# Patient Record
Sex: MALE | Race: WHITE | URBAN - METROPOLITAN AREA
[De-identification: names, ages, dates, MRNs, and addresses within clinical notes are randomized per-mention and may not be internally consistent; named-entity substitution may affect disease eponyms.]

---

## 2024-02-21 ENCOUNTER — OFFICE VISIT (OUTPATIENT)
Dept: FAMILY MEDICINE CLINIC | Age: 24
End: 2024-02-21
Payer: COMMERCIAL

## 2024-02-21 VITALS
OXYGEN SATURATION: 98 % | WEIGHT: 160 LBS | DIASTOLIC BLOOD PRESSURE: 64 MMHG | BODY MASS INDEX: 22.9 KG/M2 | SYSTOLIC BLOOD PRESSURE: 102 MMHG | HEIGHT: 70 IN | HEART RATE: 83 BPM

## 2024-02-21 DIAGNOSIS — R53.83 OTHER FATIGUE: Primary | ICD-10-CM

## 2024-02-21 PROCEDURE — G8427 DOCREV CUR MEDS BY ELIG CLIN: HCPCS | Performed by: FAMILY MEDICINE

## 2024-02-21 PROCEDURE — 1036F TOBACCO NON-USER: CPT | Performed by: FAMILY MEDICINE

## 2024-02-21 PROCEDURE — G8420 CALC BMI NORM PARAMETERS: HCPCS | Performed by: FAMILY MEDICINE

## 2024-02-21 PROCEDURE — G8484 FLU IMMUNIZE NO ADMIN: HCPCS | Performed by: FAMILY MEDICINE

## 2024-02-21 PROCEDURE — 99204 OFFICE O/P NEW MOD 45 MIN: CPT | Performed by: FAMILY MEDICINE

## 2024-02-21 RX ORDER — M-VIT,TX,IRON,MINS/CALC/FOLIC 27MG-0.4MG
1 TABLET ORAL DAILY
COMMUNITY

## 2024-02-21 ASSESSMENT — PATIENT HEALTH QUESTIONNAIRE - PHQ9
SUM OF ALL RESPONSES TO PHQ QUESTIONS 1-9: 16
SUM OF ALL RESPONSES TO PHQ9 QUESTIONS 1 & 2: 3
9. THOUGHTS THAT YOU WOULD BE BETTER OFF DEAD, OR OF HURTING YOURSELF: 0
6. FEELING BAD ABOUT YOURSELF - OR THAT YOU ARE A FAILURE OR HAVE LET YOURSELF OR YOUR FAMILY DOWN: 2
4. FEELING TIRED OR HAVING LITTLE ENERGY: 3
SUM OF ALL RESPONSES TO PHQ QUESTIONS 1-9: 16
5. POOR APPETITE OR OVEREATING: 2
SUM OF ALL RESPONSES TO PHQ QUESTIONS 1-9: 16
1. LITTLE INTEREST OR PLEASURE IN DOING THINGS: 2
3. TROUBLE FALLING OR STAYING ASLEEP: 0
8. MOVING OR SPEAKING SO SLOWLY THAT OTHER PEOPLE COULD HAVE NOTICED. OR THE OPPOSITE, BEING SO FIGETY OR RESTLESS THAT YOU HAVE BEEN MOVING AROUND A LOT MORE THAN USUAL: 3
SUM OF ALL RESPONSES TO PHQ QUESTIONS 1-9: 16
2. FEELING DOWN, DEPRESSED OR HOPELESS: 1
7. TROUBLE CONCENTRATING ON THINGS, SUCH AS READING THE NEWSPAPER OR WATCHING TELEVISION: 3

## 2024-03-04 ENCOUNTER — TELEMEDICINE (OUTPATIENT)
Dept: PSYCHOLOGY | Age: 24
End: 2024-03-04
Payer: COMMERCIAL

## 2024-03-04 DIAGNOSIS — F43.23 ADJUSTMENT DISORDER WITH MIXED ANXIETY AND DEPRESSED MOOD: Primary | ICD-10-CM

## 2024-03-04 PROCEDURE — 90791 PSYCH DIAGNOSTIC EVALUATION: CPT | Performed by: PSYCHOLOGIST

## 2024-03-04 NOTE — PROGRESS NOTES
depressive symptoms, including depressed mood, deactivation, anhedonia, poor self-worth, social isolation, variable appetite, fatigue, psychomotor retardation, and poor concentration/focus. He is experiencing some anxiety, especially related to job security. Pt reports symptoms of anxiety, including racing thoughts, poor concentration/focus, restlessness, and fatigue. Pt also reports muscle tension, SOB, dizziness, tingling in extremities, headaches, and variable appetite. Sx are aggravated by situational stressors, changes in his employment and living situation, social isolation. Patient finds relief from smoking marijuana. Goals for treatment include improving self-confidence, developing better coping strategies/acceptance.    Patient lives with his parents. He has a cat and his parents have 2 dogs. 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 He does not like his job very much. He is also a [employer] . Daily routine is variable. There is no daily caffeine use. No cigarette use, drinks alcohol 1-2 times/month, smokes marijuana daily, no illegal drug use. Patient spends multiple hours a day on social media, watching TV, or editing photos/films. There is some regular exercise (weight lifting, basketball, cardio). Patient denies insomnia. Patient enjoys the following hobbies: editing photos, sports, music. Patient describes fair social support. Family history is positive for anxiety and PTSD (mom). Patient was seen for therapy when he was in college, but did not feel it was a good fit. Has question whether he has PTSD after being present during a tragic event at a music festival.      O:  MSE:    Appearance: good hygiene   Attitude: cooperative and friendly  Consciousness: alert  Orientation: oriented to person, place, time, general circumstance  Memory: recent and remote memory intact  Attention/Concentration: intact during session  Psychomotor Activity:normal  Eye Contact:

## 2024-03-28 ENCOUNTER — TELEMEDICINE (OUTPATIENT)
Dept: PSYCHOLOGY | Age: 24
End: 2024-03-28
Payer: COMMERCIAL

## 2024-03-28 DIAGNOSIS — F43.23 ADJUSTMENT DISORDER WITH MIXED ANXIETY AND DEPRESSED MOOD: Primary | ICD-10-CM

## 2024-03-28 PROCEDURE — 90832 PSYTX W PT 30 MINUTES: CPT | Performed by: PSYCHOLOGIST

## 2024-03-28 NOTE — PATIENT INSTRUCTIONS
at a time or do both sides at the same time. Listening to a prerecorded script that guides you through the process is helpful.  Sample of Progressive Muscle Relaxation Exercise:   Hands - Clench fists  tense for 5, release, rest for 10      Right forearms and hands - Extend arm, elbow locked, and bend hand back at the wrist  tense for 5, release, rest for 10      Upper right arm - Bend arms at elbows and flex biceps  tense for 5, release, rest for 10     Forehead - wrinkle forehead into frown, tense, release, rest, and/or raise eyebrows  tense for 5, release, rest for 10      Eyes - close eyes tightly, hold and release  tense for 5, release, rest for 10      Mouth - press lips tightly together  tense for 5, release, rest for 10      Jaw - open mouth wide and stick out tongue  tense for 5, release, rest for 10      Buttocks - tense  tense for 5, release, rest for 10      Abdomen  tense for 5, release, rest for 10      Chest  tense for 5, release, rest for 10      Back - arch back  tense for 5, release, rest for 10      Neck and shoulders  tense for 5, release, rest for 10      Thighs  tense for 5, release, rest for 10      Lower legs and feet - Point toes toward shin  tense for 5, release, rest for 10      Feet - Point toes and curl them under  tense for 5, release, rest for 10     You may repeat relaxing and tensing muscle groups that have you have already done to relax them further.

## 2024-03-28 NOTE — PROGRESS NOTES
Behavioral Health Consultation  Kathryn Walker, Ph.D.  Psychologist  3/28/2024  1:36 PM EDT      Time spent with Patient: 25 minutes  This is patient's second [facility] appointment.    Reason for Consult:    Chief Complaint   Patient presents with    Depression    Anxiety     Referring Provider: Jarvis Weaver MD  [address] [place] [address]    Pt provided informed consent for the behavioral health program. Discussed with patient model of service to include the limits of confidentiality (i.e. abuse reporting, suicide intervention, etc.) and short-term intervention focused approach. Pt indicated understanding. Feedback given to PCP.    TELEHEALTH VISIT -- Audio/Visual (During COVID-19 public health emergency)    Services were provided through a video synchronous discussion virtually to substitute for in-person clinic visit. Pt gave verbal informed consent to participate in telehealth services.     Conducted a risk-benefit analysis and determined that the patient's presenting problems are consistent with the use of telepsychology. Determined that the patient has sufficient knowledge and skills in the use of technology enabling them to adequately benefit from telepsychology. It was determined that this patient was able to be properly treated without an in-person session. Patient verified that they were currently located at the [place] address that was provided during registration.    Verified the following information:  Patient's identification: Yes  Patient location: [address] [place] [address] [place] [address]   Patient's call back number: [phone]   Patient's emergency contact's name and number, as well as permission to contact them if needed: Extended Emergency Contact Information  Primary Emergency Contact: delaney case  Home Phone: [phone]  Relation: Parent     Provider location: [place], [place]     S:  Patient reported that he enjoyed traveling for work over the past 2 weeks. Admitted to feeling